# Patient Record
Sex: FEMALE | Race: OTHER | URBAN - METROPOLITAN AREA
[De-identification: names, ages, dates, MRNs, and addresses within clinical notes are randomized per-mention and may not be internally consistent; named-entity substitution may affect disease eponyms.]

---

## 2018-03-13 NOTE — PROGRESS NOTES
Apurva Chen is a 27year old female new to our office today. Referred by self- Internet search. S:  Patient presents today with c/o being heavier as a child. Patient sees excess weight as having a moderate effect on health and quality of life.  Juanis thyromegaly  LUNGS: CTA in all fields, breathing non labored  CARDIO: RRR without murmur, normal S1 and S2 without clicks or gallops, no pedal edema.  EKG in office completed- Normal axis, NSR without any ST or T wave changes QTc 422  GI: rotund, no masses, the [facility]. ..your Lifestyle Renovation begins now! Thank you for placing your trust in our health care team, I look forward to working with you along this journey to better health!     Next steps:     1.  Schedule a personal nutrition co done in your home or place of work with little time commitment. Don't forget to schedule your 1 month follow-up visit! Medication use and SEs reviewed with patient. Return in about 1 month (around 4/14/2018) for weight management.     Patient

## 2018-03-14 ENCOUNTER — OFFICE VISIT (OUTPATIENT)
Dept: INTERNAL MEDICINE CLINIC | Facility: CLINIC | Age: 33
End: 2018-03-14

## 2018-03-14 VITALS
WEIGHT: 235 LBS | BODY MASS INDEX: 38.68 KG/M2 | RESPIRATION RATE: 16 BRPM | HEART RATE: 80 BPM | HEIGHT: 65.5 IN | DIASTOLIC BLOOD PRESSURE: 78 MMHG | SYSTOLIC BLOOD PRESSURE: 120 MMHG

## 2018-03-14 DIAGNOSIS — F43.9 STRESS: ICD-10-CM

## 2018-03-14 DIAGNOSIS — Z51.81 ENCOUNTER FOR THERAPEUTIC DRUG MONITORING: Primary | ICD-10-CM

## 2018-03-14 DIAGNOSIS — E66.09 CLASS 2 OBESITY DUE TO EXCESS CALORIES WITH BODY MASS INDEX (BMI) OF 38.0 TO 38.9 IN ADULT, UNSPECIFIED WHETHER SERIOUS COMORBIDITY PRESENT: ICD-10-CM

## 2018-03-14 PROCEDURE — 99203 OFFICE O/P NEW LOW 30 MIN: CPT | Performed by: NURSE PRACTITIONER

## 2018-03-14 PROCEDURE — 93000 ELECTROCARDIOGRAM COMPLETE: CPT | Performed by: NURSE PRACTITIONER

## 2018-03-14 RX ORDER — PHENTERMINE HYDROCHLORIDE 15 MG/1
15 CAPSULE ORAL EVERY MORNING
Qty: 30 CAPSULE | Refills: 0 | Status: SHIPPED | OUTPATIENT
Start: 2018-03-14 | End: 2018-05-03 | Stop reason: DRUGHIGH

## 2018-03-14 NOTE — PATIENT INSTRUCTIONS
Welcome to the [facility]. ..your Lifestyle Renovation begins now! Thank you for placing your trust in our health care team, I look forward to working with you along this journey to better health!     Next steps:     1.  Schedule a personal n alternative can be done in your home or place of work with little time commitment. Don't forget to schedule your 1 month follow-up visit!

## 2018-04-03 ENCOUNTER — OFFICE VISIT (OUTPATIENT)
Dept: INTERNAL MEDICINE CLINIC | Facility: CLINIC | Age: 33
End: 2018-04-03

## 2018-04-03 DIAGNOSIS — E66.01 CLASS 3 SEVERE OBESITY DUE TO EXCESS CALORIES WITHOUT SERIOUS COMORBIDITY WITH BODY MASS INDEX (BMI) OF 40.0 TO 44.9 IN ADULT (HCC): Primary | ICD-10-CM

## 2018-04-03 PROCEDURE — 97802 MEDICAL NUTRITION INDIV IN: CPT | Performed by: DIETITIAN, REGISTERED

## 2018-04-04 NOTE — PROGRESS NOTES
INITIAL OUTPATIENT NUTRITION CONSULTATION    Nutrition Assessment    Medical Diagnosis: Obesity    Client Age and Gender: 27year old female    Marital Status and Occupation:  with 2  children.   Homemaker    Problem List as of 4/3/2018 loss    Physical Activity: 0 hrs/week     Food Journal: written for consultation    Spent 45 minutes in consultation with the patient. Nutrition Intervention/Education:  Comprehensive nutrition education and evaluation provided for weight loss.   Pt ha

## 2018-05-03 ENCOUNTER — OFFICE VISIT (OUTPATIENT)
Dept: INTERNAL MEDICINE CLINIC | Facility: CLINIC | Age: 33
End: 2018-05-03

## 2018-05-03 VITALS
SYSTOLIC BLOOD PRESSURE: 120 MMHG | DIASTOLIC BLOOD PRESSURE: 72 MMHG | HEART RATE: 78 BPM | BODY MASS INDEX: 37.92 KG/M2 | WEIGHT: 230.38 LBS | RESPIRATION RATE: 16 BRPM | OXYGEN SATURATION: 99 % | HEIGHT: 65.5 IN

## 2018-05-03 DIAGNOSIS — Z51.81 ENCOUNTER FOR THERAPEUTIC DRUG MONITORING: Primary | ICD-10-CM

## 2018-05-03 DIAGNOSIS — E66.09 CLASS 2 OBESITY DUE TO EXCESS CALORIES WITH BODY MASS INDEX (BMI) OF 38.0 TO 38.9 IN ADULT, UNSPECIFIED WHETHER SERIOUS COMORBIDITY PRESENT: ICD-10-CM

## 2018-05-03 PROCEDURE — 99213 OFFICE O/P EST LOW 20 MIN: CPT | Performed by: NURSE PRACTITIONER

## 2018-05-03 RX ORDER — PHENTERMINE HYDROCHLORIDE 37.5 MG/1
37.5 TABLET ORAL
Qty: 30 TABLET | Refills: 0 | Status: SHIPPED | OUTPATIENT
Start: 2018-05-03 | End: 2018-07-12

## 2018-05-03 RX ORDER — PHENTERMINE HYDROCHLORIDE 15 MG/1
15 CAPSULE ORAL EVERY MORNING
Qty: 30 CAPSULE | Refills: 0 | Status: CANCELLED | OUTPATIENT
Start: 2018-05-03

## 2018-05-03 NOTE — PROGRESS NOTES
Marysol Turner is a 27year old female presents today for 2 month follow-up on medical weight loss program for the treatment of overweight, obesity, or morbid obesity.     S:  Current weight Wt Readings from Last 6 Encounters:  05/03/18 : 230 lb 6 oz  03/14 drug monitoring  (primary encounter diagnosis)  Class 2 obesity due to excess calories with body mass index (bmi) of 38.0 to 38.9 in adult, unspecified whether serious comorbidity present    No orders of the defined types were placed in this encounter. trying to use emotional eating to soothe feelings of stress, depression, loneliness, frustration or boredom, in the long run, overeating to feed those feelings only makes them worse.   But learning how to stop overeating and control emotional eating can sup protein foods, like fish, lean poultry and low-fat dairy. 7. Eat mini-meals often. By eating 5 or 6 small healthy meals a day, including breakfast, you help keep your blood sugar and moods stable. 8. Get rid of temptations.  Don’t keep unhealthy food in t

## 2018-05-03 NOTE — PATIENT INSTRUCTIONS
Congratulations on your 5# weight loss! Continue making lifestyle changes that focus on good nutrition, regular exercise and stress management.     Today we reviewed your labs with findings of: n/a, not completed- please complete at [facility] prior to next vis eating. 1. Make a commitment. Like any established bad habit, nothing will change unless you make a commit to changing your behavior. 2. Practice awareness.  To be more conscious of what’s happening, jot down when and what you eat and how you feel before new health-oriented friends or join a support group. Learning how to stop emotional eating and overeating is a life-changing experience. Just make sure to stay on track and enjoy the journey.     Posted By Paco Omer On December 27, 2015 @ 11:33 am In [facility] [place]

## 2018-05-10 PROBLEM — M67.431 GANGLION CYST OF DORSUM OF RIGHT WRIST: Status: ACTIVE | Noted: 2018-05-10

## 2018-05-14 ENCOUNTER — TELEPHONE (OUTPATIENT)
Dept: INTERNAL MEDICINE CLINIC | Facility: CLINIC | Age: 33
End: 2018-05-14

## 2018-05-14 NOTE — TELEPHONE ENCOUNTER
Pt was seen on 5/03 and given and was given an Rx of Phentermine. Pt stated that her period came two weeks early and is very heavy.  She has since cut the medication in half and would like to know if she should continue to take the Rx at 1/2 tab or start ba

## 2018-05-16 NOTE — TELEPHONE ENCOUNTER
Spoke with patient and informed her that the phentermine would not interfere with her menses. Patient states that she is currently on day 4 of half phentermine tablet.  I informed patient to continue taking a half tablet until she reaches day 7 and then on

## 2018-07-12 ENCOUNTER — OFFICE VISIT (OUTPATIENT)
Dept: INTERNAL MEDICINE CLINIC | Facility: CLINIC | Age: 33
End: 2018-07-12

## 2018-07-12 VITALS
RESPIRATION RATE: 16 BRPM | HEART RATE: 82 BPM | WEIGHT: 224 LBS | DIASTOLIC BLOOD PRESSURE: 80 MMHG | SYSTOLIC BLOOD PRESSURE: 120 MMHG | BODY MASS INDEX: 36.87 KG/M2 | HEIGHT: 65.5 IN

## 2018-07-12 DIAGNOSIS — E66.09 CLASS 2 OBESITY DUE TO EXCESS CALORIES WITH BODY MASS INDEX (BMI) OF 38.0 TO 38.9 IN ADULT, UNSPECIFIED WHETHER SERIOUS COMORBIDITY PRESENT: ICD-10-CM

## 2018-07-12 DIAGNOSIS — Z51.81 ENCOUNTER FOR THERAPEUTIC DRUG MONITORING: Primary | ICD-10-CM

## 2018-07-12 PROCEDURE — 99214 OFFICE O/P EST MOD 30 MIN: CPT | Performed by: NURSE PRACTITIONER

## 2018-07-12 RX ORDER — PHENTERMINE HYDROCHLORIDE 37.5 MG/1
37.5 TABLET ORAL
Qty: 30 TABLET | Refills: 0 | Status: SHIPPED | OUTPATIENT
Start: 2018-07-12 | End: 2018-09-06

## 2018-07-12 RX ORDER — TOPIRAMATE 25 MG/1
25 TABLET ORAL 2 TIMES DAILY
Qty: 60 TABLET | Refills: 1 | Status: SHIPPED | OUTPATIENT
Start: 2018-07-12 | End: 2018-09-06

## 2018-07-12 NOTE — PROGRESS NOTES
Ar Figueroa is a 26year old female presents today for 4 month follow-up on medical weight loss program for the treatment of overweight, obesity, or morbid obesity.     S:  Current weight Wt Readings from Last 6 Encounters:  07/12/18 : 224 lb  05/10/18 : 2 obesity due to excess calories with body mass index (bmi) of 38.0 to 38.9 in adult, unspecified whether serious comorbidity present    No orders of the defined types were placed in this encounter.       Meds & Refills for this Visit:  Signed Prescriptions Complete fasting labs. Get In Touch With Your Appetite- Hunger Cues  There are many signals that tell us it's time to eat (other than a rumbling stomach): television ads, social events, smells from the food court, and the candy bowl at the office.  These game with someone else  **   Hunger-Satiety Rating Scale    Full - 10        10 = Stuffed to the point of feeling sick   9 = Very uncomfortably full, need to loosen your belt    8 = Uncomfortably full, feel stuffed    7 = Very full, feel as if you have ove feed-your-feelings bull by the rhett, here’s our basic 12 step program for how to stop emotional eating. 1. Make a commitment. Like any established bad habit, nothing will change unless you make a commit to changing your behavior. 2. Practice awareness. you have a support network of friends or family. And if no one you know is supportive, make some new health-oriented friends or join a support group. Learning how to stop emotional eating and overeating is a life-changing experience.  Just make sure to sta

## 2018-07-12 NOTE — PATIENT INSTRUCTIONS
Congratulations on your 6# weight loss! Continue making lifestyle changes that focus on good nutrition, regular exercise and stress management.     Today we reviewed your labs with findings of: n/a, not completed- please complete at [facility] prior to next vis when you are starving and unable to concentrate — may lead to overeating. When you first start to feel any of the symptoms listed above, you should probably start to think about eating.   We often let the sight of food tempt us when we are above a level 6 o feelings only makes them worse. But learning how to stop overeating and control emotional eating can support healthy permanent weight loss and make you feel powerful.   Stop Emotional Eating – Don’t Use Foods to Jb Marino probably already know that keep your blood sugar and moods stable. 8. Get rid of temptations. Don’t keep unhealthy food in the house, don’t shop for food when hungry or stressed and plan ahead before eating out. 9. Get enough sleep.  When you’re tired, it’s easier to give in to [facility] [place]

## 2018-07-25 ENCOUNTER — TELEPHONE (OUTPATIENT)
Dept: INTERNAL MEDICINE CLINIC | Facility: CLINIC | Age: 33
End: 2018-07-25

## 2018-07-25 NOTE — TELEPHONE ENCOUNTER
Spoke with patient and explained the directions for topamax per Melissa's discharge instructions. Patient voiced understanding.

## 2018-07-25 NOTE — TELEPHONE ENCOUNTER
LMTCB. Medication Plan: Resume phentermine. Start phentermine at 1/2 tab daily in AM with breakfast for 1 week, then increase to a full tab daily as prescribed.  Start Topamax at 1 tab daily before dinner for 1 week, then increase to 1 tab twice a day be

## 2018-09-06 ENCOUNTER — OFFICE VISIT (OUTPATIENT)
Dept: INTERNAL MEDICINE CLINIC | Facility: CLINIC | Age: 33
End: 2018-09-06
Payer: COMMERCIAL

## 2018-09-06 VITALS
WEIGHT: 222 LBS | HEART RATE: 72 BPM | DIASTOLIC BLOOD PRESSURE: 78 MMHG | OXYGEN SATURATION: 99 % | HEIGHT: 65.5 IN | BODY MASS INDEX: 36.54 KG/M2 | SYSTOLIC BLOOD PRESSURE: 128 MMHG | TEMPERATURE: 99 F

## 2018-09-06 DIAGNOSIS — E66.09 CLASS 2 OBESITY DUE TO EXCESS CALORIES WITH BODY MASS INDEX (BMI) OF 38.0 TO 38.9 IN ADULT, UNSPECIFIED WHETHER SERIOUS COMORBIDITY PRESENT: ICD-10-CM

## 2018-09-06 DIAGNOSIS — Z51.81 ENCOUNTER FOR THERAPEUTIC DRUG MONITORING: Primary | ICD-10-CM

## 2018-09-06 PROCEDURE — 99214 OFFICE O/P EST MOD 30 MIN: CPT | Performed by: NURSE PRACTITIONER

## 2018-09-06 RX ORDER — PHENTERMINE HYDROCHLORIDE 37.5 MG/1
37.5 TABLET ORAL
Qty: 30 TABLET | Refills: 0 | Status: SHIPPED | OUTPATIENT
Start: 2018-09-06 | End: 2018-10-04

## 2018-09-06 RX ORDER — TOPIRAMATE 25 MG/1
25 TABLET ORAL 2 TIMES DAILY
Qty: 60 TABLET | Refills: 1 | Status: SHIPPED | OUTPATIENT
Start: 2018-09-06 | End: 2018-10-04 | Stop reason: ALTCHOICE

## 2018-09-06 NOTE — PROGRESS NOTES
Rangel Penny is a 26year old female presents today for 6 month follow-up on medical weight loss program for the treatment of overweight, obesity, or morbid obesity.     S:  Current weight Wt Readings from Last 6 Encounters:  09/06/18 : 222 lb  07/12/18 : LUNGS: CTA in all fields, breathing non labored  CARDIO: RRR without murmur, normal S1 and S2 without clicks or gallops, no pedal edema.    GI: +BS, soft  NEURO/MS: motor and sensory grossly intact  PSYCH: pleasant, cooperative, normal mood and affect    AS Medication Plan: Resume phentermine. Start phentermine at 1/2 tab daily in AM with breakfast for 1 week, then increase to a full tab daily as prescribed.  Start Topamax at 1 tab daily before dinner for 1 week, then increase to 1 tab twice a day before meals So imagine if it were 25 lbs or 50 lbs of lost fat vs muscle gained. This is why it’s possible for you to lose fat inches when exercising, yet show no change in scale weight.  And can you see how firm the muscle looks compared to the lumpy, tapioca pudding You have been exercising for 30 minutes most days of the week. Now you can move on to the next stage: increasing the intensity. This means doing your activity in one or more of these ways:  · Longer. Exercise for 30 minutes or more without a break.   · Fast © 2763-8824 The [address] [place] [address], [address]. All rights reserved. This information is not intended as a substitute for professional medical care. Always follow your healthcare professional's instructions.       Greater t

## 2018-09-06 NOTE — PATIENT INSTRUCTIONS
Continue making lifestyle changes that focus on good nutrition, regular exercise and stress management. Today we reviewed your labs with findings of: n/a, not completed- please complete at [facility] prior to next visit, fast 10-12 hours with water only. of muscle vs fat of the same weight. Notice 5 lbs of fat is three times bigger. This means that if you were to build 5 lbs of muscle and lose 5 lbs of fat, you would weigh exactly the same, but look smaller and firmer.   So imagine if it were 25 lbs or 5 www.[employer] ([employer]) [employer]. OpenPortal by Symone Villafana    Exercise: Adding Intensity  You have been exercising for 30 minutes most days of the week. Now you can move on to the next stage: increasing the intensity.  This means doing your activity in one or more of these w substitute for professional medical care. Always follow your healthcare professional's instructions.

## 2018-09-19 ENCOUNTER — TELEPHONE (OUTPATIENT)
Dept: INTERNAL MEDICINE CLINIC | Facility: CLINIC | Age: 33
End: 2018-09-19

## 2018-09-19 NOTE — TELEPHONE ENCOUNTER
Patient states headache x3 days and elevated BP. Last 2 readings at home was 142/87 and second was normal for her . Describes headache as making it hard for her to concentrate and hard to see.     Per Alberto Hoang discontinue topamax to see if this medication i

## 2018-09-19 NOTE — TELEPHONE ENCOUNTER
Patient called has questions regarding rx topomax , pt stated ever since her dose increased she has been getting headaches and feels her hypertension has elavated

## 2018-10-04 ENCOUNTER — OFFICE VISIT (OUTPATIENT)
Dept: INTERNAL MEDICINE CLINIC | Facility: CLINIC | Age: 33
End: 2018-10-04
Payer: COMMERCIAL

## 2018-10-04 ENCOUNTER — LAB ENCOUNTER (OUTPATIENT)
Dept: LAB | Age: 33
End: 2018-10-04
Attending: NURSE PRACTITIONER
Payer: COMMERCIAL

## 2018-10-04 VITALS
HEIGHT: 65.5 IN | SYSTOLIC BLOOD PRESSURE: 126 MMHG | WEIGHT: 205 LBS | DIASTOLIC BLOOD PRESSURE: 80 MMHG | TEMPERATURE: 98 F | HEART RATE: 62 BPM | BODY MASS INDEX: 33.75 KG/M2 | RESPIRATION RATE: 16 BRPM

## 2018-10-04 DIAGNOSIS — Z51.81 ENCOUNTER FOR THERAPEUTIC DRUG MONITORING: ICD-10-CM

## 2018-10-04 DIAGNOSIS — Z51.81 ENCOUNTER FOR THERAPEUTIC DRUG MONITORING: Primary | ICD-10-CM

## 2018-10-04 DIAGNOSIS — E66.09 CLASS 2 OBESITY DUE TO EXCESS CALORIES WITH BODY MASS INDEX (BMI) OF 38.0 TO 38.9 IN ADULT, UNSPECIFIED WHETHER SERIOUS COMORBIDITY PRESENT: ICD-10-CM

## 2018-10-04 PROCEDURE — 82397 CHEMILUMINESCENT ASSAY: CPT

## 2018-10-04 PROCEDURE — 82607 VITAMIN B-12: CPT

## 2018-10-04 PROCEDURE — 82306 VITAMIN D 25 HYDROXY: CPT

## 2018-10-04 PROCEDURE — 84443 ASSAY THYROID STIM HORMONE: CPT

## 2018-10-04 PROCEDURE — 83036 HEMOGLOBIN GLYCOSYLATED A1C: CPT

## 2018-10-04 PROCEDURE — 80061 LIPID PANEL: CPT

## 2018-10-04 PROCEDURE — 99213 OFFICE O/P EST LOW 20 MIN: CPT | Performed by: NURSE PRACTITIONER

## 2018-10-04 PROCEDURE — 80053 COMPREHEN METABOLIC PANEL: CPT

## 2018-10-04 PROCEDURE — 85025 COMPLETE CBC W/AUTO DIFF WBC: CPT

## 2018-10-04 RX ORDER — PHENTERMINE HYDROCHLORIDE 37.5 MG/1
37.5 TABLET ORAL
Qty: 30 TABLET | Refills: 0 | Status: SHIPPED | OUTPATIENT
Start: 2018-10-04 | End: 2018-11-08

## 2018-10-04 NOTE — PATIENT INSTRUCTIONS
Continue making lifestyle changes that focus on good nutrition, regular exercise and stress management. Today we reviewed your labs with findings of: n/a, not completed- please complete at [facility] prior to next visit, fast 10-12 hours with water only.

## 2018-10-04 NOTE — PROGRESS NOTES
Luís Carnes is a 26year old female presents today for 7 month follow-up on medical weight loss program for the treatment of overweight, obesity, or morbid obesity.     S:  Current weight Wt Readings from Last 6 Encounters:  10/04/18 : 205 lb  09/06/18 : grossly intact  PSYCH: pleasant, cooperative, normal mood and affect    ASSESSMENT AND PLAN:  Encounter for therapeutic drug monitoring  (primary encounter diagnosis)  Class 2 obesity due to excess calories with body mass index (bmi) of 38.0 to 38.9 in ronald program.  · Charla @ [email or web address]. Personal training and Group Fitness classes with Gio Montanez [phone]  · [employer] [address] @ [email or web address]  Group Fitness [phone] and/or email Marycruz Garcia at [employer]

## 2018-10-08 ENCOUNTER — TELEPHONE (OUTPATIENT)
Dept: INTERNAL MEDICINE CLINIC | Facility: CLINIC | Age: 33
End: 2018-10-08

## 2018-10-08 DIAGNOSIS — E55.9 VITAMIN D DEFICIENCY: Primary | ICD-10-CM

## 2018-10-08 RX ORDER — ERGOCALCIFEROL 1.25 MG/1
50000 CAPSULE ORAL WEEKLY
Qty: 4 CAPSULE | Refills: 5 | Status: SHIPPED | OUTPATIENT
Start: 2018-10-08 | End: 2021-04-08

## 2018-11-08 ENCOUNTER — OFFICE VISIT (OUTPATIENT)
Dept: INTERNAL MEDICINE CLINIC | Facility: CLINIC | Age: 33
End: 2018-11-08
Payer: COMMERCIAL

## 2018-11-08 VITALS
WEIGHT: 197 LBS | DIASTOLIC BLOOD PRESSURE: 68 MMHG | HEIGHT: 65.5 IN | HEART RATE: 80 BPM | BODY MASS INDEX: 32.43 KG/M2 | RESPIRATION RATE: 16 BRPM | SYSTOLIC BLOOD PRESSURE: 122 MMHG

## 2018-11-08 DIAGNOSIS — E66.09 CLASS 2 OBESITY DUE TO EXCESS CALORIES WITH BODY MASS INDEX (BMI) OF 38.0 TO 38.9 IN ADULT, UNSPECIFIED WHETHER SERIOUS COMORBIDITY PRESENT: ICD-10-CM

## 2018-11-08 DIAGNOSIS — Z51.81 ENCOUNTER FOR THERAPEUTIC DRUG MONITORING: Primary | ICD-10-CM

## 2018-11-08 DIAGNOSIS — E55.9 VITAMIN D DEFICIENCY: ICD-10-CM

## 2018-11-08 PROCEDURE — 99213 OFFICE O/P EST LOW 20 MIN: CPT | Performed by: NURSE PRACTITIONER

## 2018-11-08 RX ORDER — PHENTERMINE HYDROCHLORIDE 37.5 MG/1
37.5 TABLET ORAL
Qty: 30 TABLET | Refills: 1 | Status: SHIPPED | OUTPATIENT
Start: 2018-11-08 | End: 2021-04-08

## 2018-11-08 NOTE — PROGRESS NOTES
Vic Mayorga is a 26year old female presents today for 8 month follow-up on medical weight loss program for the treatment of overweight, obesity, or morbid obesity.     S:  Current weight Wt Readings from Last 6 Encounters:  11/08/18 : 197 lb  10/04/18 : for therapeutic drug monitoring  (primary encounter diagnosis)  Class 2 obesity due to excess calories with body mass index (bmi) of 38.0 to 38.9 in adult, unspecified whether serious comorbidity present  Vitamin d deficiency    No orders of the defined ty diagram below you’ll see why healthy permanent weight loss requires you to build muscle to lose fat. Fat vs Muscle Diagram  The facts are clear. The best way to lose fat and look slimmer is to build muscle.  Since one picture’s worth a thousand words, here osteoporosis. Studies show that weight training combined with aerobics and stretching is the best way to build a strong, firm body and keep it that way. So, if you want to look and feel better than ever, you need to build muscle to lose fat.      Taken fr

## 2021-01-07 ENCOUNTER — TELEPHONE (OUTPATIENT)
Dept: INTERNAL MEDICINE CLINIC | Facility: CLINIC | Age: 36
End: 2021-01-07

## 2021-01-07 NOTE — TELEPHONE ENCOUNTER
Pt called left vmail regarding [facility] community ins     Returned pts call - left vmail notified referral needed

## 2021-03-09 ENCOUNTER — TELEPHONE (OUTPATIENT)
Dept: INTERNAL MEDICINE CLINIC | Facility: CLINIC | Age: 36
End: 2021-03-09

## 2021-04-07 NOTE — PROGRESS NOTES
Spencer Savage is a 38year old female presents today for restart on medical weight loss program for the treatment of overweight, obesity, or morbid obesity.     S:  Current weight Wt Readings from Last 6 Encounters:  04/08/21 : 240 lb (108.9 kg)  01/07/19 pleasant, cooperative, normal mood and affect    ASSESSMENT AND PLAN:  Encounter for therapeutic drug monitoring  (primary encounter diagnosis)  Class 2 obesity due to excess calories with body mass index (bmi) of 38.0 to 38.9 in adult, unspecified whether found on the CALM ana under more. 6 Important Wellness Tips to Adopt for a Healthy 2021 January 1, 2021 • Posted in Saravanan Wells • By Your Wells [place] [employer] Campaign    A new year is upon us!  Now is a time that many people get in the spirit of reflect detox is good for mental and emotional health as well as your physical health. Replace more screen time this year with activities that will improve your quality of life. 5 – Drop the Need for Perfectionism  Perfection just isn't possible all of the time.

## 2021-04-08 ENCOUNTER — OFFICE VISIT (OUTPATIENT)
Dept: INTERNAL MEDICINE CLINIC | Facility: CLINIC | Age: 36
End: 2021-04-08
Payer: MEDICAID

## 2021-04-08 VITALS
WEIGHT: 240 LBS | DIASTOLIC BLOOD PRESSURE: 68 MMHG | BODY MASS INDEX: 38.57 KG/M2 | RESPIRATION RATE: 14 BRPM | SYSTOLIC BLOOD PRESSURE: 120 MMHG | HEIGHT: 66 IN | HEART RATE: 78 BPM

## 2021-04-08 DIAGNOSIS — Z51.81 ENCOUNTER FOR THERAPEUTIC DRUG MONITORING: Primary | ICD-10-CM

## 2021-04-08 DIAGNOSIS — E66.09 CLASS 2 OBESITY DUE TO EXCESS CALORIES WITH BODY MASS INDEX (BMI) OF 38.0 TO 38.9 IN ADULT, UNSPECIFIED WHETHER SERIOUS COMORBIDITY PRESENT: ICD-10-CM

## 2021-04-08 DIAGNOSIS — R63.5 WEIGHT GAIN: ICD-10-CM

## 2021-04-08 PROCEDURE — 3078F DIAST BP <80 MM HG: CPT | Performed by: NURSE PRACTITIONER

## 2021-04-08 PROCEDURE — 3008F BODY MASS INDEX DOCD: CPT | Performed by: NURSE PRACTITIONER

## 2021-04-08 PROCEDURE — 3074F SYST BP LT 130 MM HG: CPT | Performed by: NURSE PRACTITIONER

## 2021-04-08 PROCEDURE — 99213 OFFICE O/P EST LOW 20 MIN: CPT | Performed by: NURSE PRACTITIONER

## 2021-04-08 RX ORDER — PHENTERMINE HYDROCHLORIDE 37.5 MG/1
37.5 TABLET ORAL EVERY MORNING
Qty: 30 TABLET | Refills: 0 | Status: SHIPPED | OUTPATIENT
Start: 2021-04-08 | End: 2021-06-02

## 2021-04-08 NOTE — PATIENT INSTRUCTIONS
Welcome back, begin making lifestyle changes that focus on good nutrition, regular exercise and stress management. Medication Plan: Restart phentermine at 1/2 tab daily in AM for as long as effective, then increase to a full tab daily if needed.     Anusha Forbes benefits, including stress management and better sleep. This year, set measurable and attainable goals for getting more exercise that focus on overall health and well-being, not a number on the scale.   The Centers for Disease Control recommends at least 15

## 2021-06-02 ENCOUNTER — OFFICE VISIT (OUTPATIENT)
Dept: INTERNAL MEDICINE CLINIC | Facility: CLINIC | Age: 36
End: 2021-06-02
Payer: MEDICAID

## 2021-06-02 VITALS
RESPIRATION RATE: 14 BRPM | BODY MASS INDEX: 38.19 KG/M2 | SYSTOLIC BLOOD PRESSURE: 120 MMHG | HEART RATE: 80 BPM | WEIGHT: 232 LBS | HEIGHT: 65.5 IN | DIASTOLIC BLOOD PRESSURE: 74 MMHG

## 2021-06-02 DIAGNOSIS — E66.09 CLASS 2 OBESITY DUE TO EXCESS CALORIES WITH BODY MASS INDEX (BMI) OF 38.0 TO 38.9 IN ADULT, UNSPECIFIED WHETHER SERIOUS COMORBIDITY PRESENT: ICD-10-CM

## 2021-06-02 DIAGNOSIS — Z51.81 ENCOUNTER FOR THERAPEUTIC DRUG MONITORING: Primary | ICD-10-CM

## 2021-06-02 PROBLEM — R63.5 WEIGHT GAIN: Status: ACTIVE | Noted: 2021-06-02

## 2021-06-02 PROCEDURE — 3008F BODY MASS INDEX DOCD: CPT | Performed by: NURSE PRACTITIONER

## 2021-06-02 PROCEDURE — 3078F DIAST BP <80 MM HG: CPT | Performed by: NURSE PRACTITIONER

## 2021-06-02 PROCEDURE — 99213 OFFICE O/P EST LOW 20 MIN: CPT | Performed by: NURSE PRACTITIONER

## 2021-06-02 PROCEDURE — 3074F SYST BP LT 130 MM HG: CPT | Performed by: NURSE PRACTITIONER

## 2021-06-02 RX ORDER — PHENTERMINE HYDROCHLORIDE 37.5 MG/1
37.5 TABLET ORAL EVERY MORNING
Qty: 30 TABLET | Refills: 1 | Status: SHIPPED | OUTPATIENT
Start: 2021-06-02 | End: 2021-08-09

## 2021-06-02 NOTE — PATIENT INSTRUCTIONS
Welcome back, begin making lifestyle changes that focus on good nutrition, regular exercise and stress management. Medication Plan: Increase phentermine to full dose.     Agreed upon goal/s before next office visit include:     Develop a fitness routine exercise? • What health and fitness goals do you want to achieve? Build your plan off of the answers to these questions. If you are a busy mom and you want to lose weight to gain more energy, you might not have a lot of time.  Maybe your only form of exer or machine weights. You can also use your body weight for exercises like push-ups, chin-ups, planks, etc.  The FITT Principle: Final Considerations  You can use the FITT Principle for cardio exercise, strength-based exercise, stretching and more.  However, recommended) @ www. [email or web address], Metabolic Meals @ www. Imcompanyals. com - individual prepared meals to go  · [employer], [employer], Every Plate, [employer]- on line meal delivery programs for preparation at home  · [employer] in [place] for home

## 2021-06-02 NOTE — PROGRESS NOTES
Lisa Martinez is a 38year old female presents today for f/u on medical weight loss program for the treatment of overweight, obesity, or morbid obesity.     S:  Current weight Wt Readings from Last 6 Encounters:  06/02/21 : 232 lb (105.2 kg)  04/08/21 : 24 calories with body mass index (bmi) of 38.0 to 38.9 in adult, unspecified whether serious comorbidity present    No orders of the defined types were placed in this encounter.       Meds & Refills for this Visit:  Requested Prescriptions     Signed Prescript improve your fitness, you must self-monitor your workouts and make changes when necessary. One of the best tools you can use to help you is the FITT Principle. FITT is an acronym that outlines the basic components of a successful exercise plan.   • Deja Turner Should You Exercise? The time you spend exercising will usually depend on what you are doing. Health experts recommend at least 30 minutes of cardio exercise each workout.  However, if you are doing a strength-based exercise, you will likely pay more atten and/or email @ [email or web address]  · [employer] @ [email or web address] Group Fitness [phone] and/or email [facility] at [email or web address]    Online Fitness  · Fitness  on Swype in 10 DVD series   www. fitin10 Shavon Johnson  · The Complete Guide to fasting by Dr. Marium Landin  · Sugar, Salt & Fat by Edison Potter, Ph.D, R.D.  · Weight Loss Surgery Will Not Treat Food Addiction by Emily Cano Ph.D  · The [employer]- NanoInk Documentary on plant based nutrition  · Fed Up

## 2021-06-03 ENCOUNTER — TELEPHONE (OUTPATIENT)
Dept: INTERNAL MEDICINE CLINIC | Facility: CLINIC | Age: 36
End: 2021-06-03

## 2021-07-02 ENCOUNTER — TELEPHONE (OUTPATIENT)
Dept: INTERNAL MEDICINE CLINIC | Facility: CLINIC | Age: 36
End: 2021-07-02

## 2021-07-29 ENCOUNTER — TELEPHONE (OUTPATIENT)
Dept: INTERNAL MEDICINE CLINIC | Facility: CLINIC | Age: 36
End: 2021-07-29

## 2021-08-09 ENCOUNTER — OFFICE VISIT (OUTPATIENT)
Dept: INTERNAL MEDICINE CLINIC | Facility: CLINIC | Age: 36
End: 2021-08-09
Payer: MEDICAID

## 2021-08-09 VITALS
BODY MASS INDEX: 35.56 KG/M2 | SYSTOLIC BLOOD PRESSURE: 110 MMHG | RESPIRATION RATE: 14 BRPM | HEART RATE: 80 BPM | WEIGHT: 216 LBS | HEIGHT: 65.5 IN | DIASTOLIC BLOOD PRESSURE: 70 MMHG

## 2021-08-09 DIAGNOSIS — Z51.81 ENCOUNTER FOR THERAPEUTIC DRUG MONITORING: Primary | ICD-10-CM

## 2021-08-09 DIAGNOSIS — E66.09 CLASS 2 OBESITY DUE TO EXCESS CALORIES WITH BODY MASS INDEX (BMI) OF 38.0 TO 38.9 IN ADULT, UNSPECIFIED WHETHER SERIOUS COMORBIDITY PRESENT: ICD-10-CM

## 2021-08-09 PROCEDURE — 3078F DIAST BP <80 MM HG: CPT | Performed by: NURSE PRACTITIONER

## 2021-08-09 PROCEDURE — 99213 OFFICE O/P EST LOW 20 MIN: CPT | Performed by: NURSE PRACTITIONER

## 2021-08-09 PROCEDURE — 3008F BODY MASS INDEX DOCD: CPT | Performed by: NURSE PRACTITIONER

## 2021-08-09 PROCEDURE — 3074F SYST BP LT 130 MM HG: CPT | Performed by: NURSE PRACTITIONER

## 2021-08-09 RX ORDER — PHENTERMINE HYDROCHLORIDE 37.5 MG/1
37.5 TABLET ORAL EVERY MORNING
Qty: 30 TABLET | Refills: 1 | Status: SHIPPED | OUTPATIENT
Start: 2021-08-09

## 2021-08-09 NOTE — PROGRESS NOTES
Lala Betancourt is a 43year old female presents today for f/u on medical weight loss program for the treatment of overweight, obesity, or morbid obesity.     S:  Current weight Wt Readings from Last 6 Encounters:  08/09/21 : 216 lb (98 kg)  06/02/21 : 232 l and affect    ASSESSMENT AND PLAN:  Encounter for therapeutic drug monitoring  (primary encounter diagnosis)  Class 2 obesity due to excess calories with body mass index (bmi) of 38.0 to 38.9 in adult, unspecified whether serious comorbidity present    No Intensity  You have been exercising for 30 minutes most days of the week. Now you can move on to the next stage: increasing the intensity. This means doing your activity in one or more of these ways:  · Longer.  Exercise for 30 minutes or more without a cleopatra healthcare professional's instructions. Build Muscle & Lose Fat  The great fat vs muscle diagram below paints a clear picture of why it’s so important for you to build muscle in order to lose fat.   Maybe Darliss Cheadle wondered about muscle vs fat and why you n joints increase your level of confidence in your abilities to perform many lifestyle activities. 4. Prevent injury. Strength training can build stronger muscles and more limber, flexible joints, which play a crucial role in preventing injury.   5. Increase

## 2021-08-09 NOTE — PATIENT INSTRUCTIONS
Welcome back, begin making lifestyle changes that focus on good nutrition, regular exercise and stress management. Medication Plan: Maintain phentermine at 1/2 tab daily. Refill script should last 4 months at this dose.     Agreed upon goal/s before next before you exercise and cool down after. · Carry ID (identification) with you if you’re out alone. And be sure someone knows where you’re going. · If you’re on foot, travel against traffic (except on blind corners). If you’re on a bike, go with traffic. looks compared to the lumpy, tapioca pudding consistency of fat?   Build Muscle to Lose Fat Benefits  Although daily physical activity, like walking, swimming and aerobics are essential to good heart health and weight management, combining muscle building w

## 2023-09-21 ENCOUNTER — ORDER TRANSCRIPTION (OUTPATIENT)
Dept: ADMINISTRATIVE | Facility: HOSPITAL | Age: 38
End: 2023-09-21

## 2023-09-21 DIAGNOSIS — E66.09 CLASS 1 OBESITY DUE TO EXCESS CALORIES WITH BODY MASS INDEX (BMI) OF 34.0 TO 34.9 IN ADULT: Primary | ICD-10-CM

## 2023-10-12 ENCOUNTER — OFFICE VISIT (OUTPATIENT)
Dept: NUTRITION | Facility: HOSPITAL | Age: 38
End: 2023-10-12
Attending: PHYSICIAN ASSISTANT
Payer: COMMERCIAL

## 2023-10-12 PROCEDURE — 97802 MEDICAL NUTRITION INDIV IN: CPT

## 2023-10-12 NOTE — PROGRESS NOTES
ADULT INITIAL OUTPATIENT NUTRITION CONSULTATION    Nutrition Assessment    Medical Diagnosis: Obesity    PMH: noted    Client Hx: 34year old female    Meds: none, phentermine in past    Labs: due at next MD visit    ANTHROPOMETRICS:  Ht: 5'5\"  Wt: 248#    BMI: 41  Weight hx:   4/2018 - 235#  8/2021 - 216#    Current Diet: Regular    Food/Beverage Intake:   BREAKFAST: wheat toast x2, eggs, coffee-black  LUNCH: snacks, protein bar, sometimes leftovers  DINNER: grilled chicken or beef, salad with dressing, toast  SNACKS: rarely fruit or sweets, nuts  BEVERAGES: diet soda, no water, coffee-3c/dy    Meal pattern: 2-3 meals/d, 2-3 snacks/d    Number of meals/week eaten at restaurants: few times/wk    Alcohol Intake: none    Diet Quality: Needs Improvement    Nutrient intake (based on diet record): ~1419-4360 cals/d    Estimated nutrition needs: 6557-5841 cals/d, 75-85 gm protein    Physical Activity: minimal  GOAL: join gym w kids, attend fitness class, 150+ min/wk    Sleep: 7-8 hrs/d    Stress/anxiety: better, coping mechanisms reviewed    Nutrition Diagnosis: Food and Nutrition related knowledge deficit related to lack of previous education by RD as evidence by pt need for education regarding weight loss management. Nutrition Intervention/Education: Comprehensive nutrition education and evaluation provided for weight loss management. Pt noted to have been seen at [facility] 2018, lost ~20# and has since gained it back. Pt tired but appears motivated to re-set and make behavioral changes needed. Discussed nutrient dense foods to choose, label reading, portion control, and planning. Written materials were issued and explained, including mindfulness. Encouraged low fat dairy, whole grains, lean meats, and f&v. All questions answered today. Pt has set goals to follow recommendations set today, to track intake using phone ana, and to contact RD with further questions or concerns.            GOALS/PLANS:  1-plan intake, log foods MFP  9-qpemcsub-459+ min/wk  3-mindfulness  4-increase water and portion control    Monitoring/Evaluation:   Pt to follow with MD/physical. May be interested in phentermine again, 1/2 tab dose. RD to follow post MD appt, suggest 1-2 months. Time spent with patient: 40 min (pt was 40 min late to appt)    Thank you for the referral,    Domenica Pratt RD, CORETTAN  Zain Suarez. [email or web address]  P: [phone]

## 2024-08-13 ENCOUNTER — TELEPHONE (OUTPATIENT)
Dept: INTERNAL MEDICINE CLINIC | Facility: CLINIC | Age: 39
End: 2024-08-13

## 2024-08-13 NOTE — TELEPHONE ENCOUNTER
Called patient to inform appointment with ELIZABETH Carrizales on 11/7/2024 needs to be cancelled as provider is unavailable on this day. Informed in vm to call back and reschedule.